# Patient Record
Sex: MALE | Race: WHITE | NOT HISPANIC OR LATINO | Employment: FULL TIME | URBAN - METROPOLITAN AREA
[De-identification: names, ages, dates, MRNs, and addresses within clinical notes are randomized per-mention and may not be internally consistent; named-entity substitution may affect disease eponyms.]

---

## 2017-04-08 ENCOUNTER — HOSPITAL ENCOUNTER (EMERGENCY)
Facility: HOSPITAL | Age: 27
Discharge: HOME | End: 2017-04-09
Payer: MEDICAID

## 2017-04-08 VITALS
OXYGEN SATURATION: 100 % | DIASTOLIC BLOOD PRESSURE: 86 MMHG | HEIGHT: 66 IN | BODY MASS INDEX: 24.91 KG/M2 | SYSTOLIC BLOOD PRESSURE: 138 MMHG | TEMPERATURE: 98 F | RESPIRATION RATE: 18 BRPM | HEART RATE: 119 BPM | WEIGHT: 155 LBS

## 2017-04-08 DIAGNOSIS — R40.20 LOC (LOSS OF CONSCIOUSNESS): ICD-10-CM

## 2017-04-08 PROCEDURE — 93005 ELECTROCARDIOGRAM TRACING: CPT

## 2017-04-08 PROCEDURE — 99900041 HC LEFT WITHOUT BEING SEEN- EMERGENCY

## 2019-02-12 ENCOUNTER — HOSPITAL ENCOUNTER (EMERGENCY)
Facility: HOSPITAL | Age: 29
Discharge: HOME | End: 2019-02-12
Attending: EMERGENCY MEDICINE

## 2019-02-12 VITALS
HEART RATE: 68 BPM | OXYGEN SATURATION: 96 % | SYSTOLIC BLOOD PRESSURE: 131 MMHG | DIASTOLIC BLOOD PRESSURE: 77 MMHG | WEIGHT: 210 LBS | TEMPERATURE: 98 F | BODY MASS INDEX: 32.96 KG/M2 | HEIGHT: 67 IN | RESPIRATION RATE: 18 BRPM

## 2019-02-12 DIAGNOSIS — R10.814 LEFT LOWER QUADRANT ABDOMINAL TENDERNESS WITHOUT REBOUND TENDERNESS: ICD-10-CM

## 2019-02-12 DIAGNOSIS — K59.00 CONSTIPATION, UNSPECIFIED CONSTIPATION TYPE: Primary | ICD-10-CM

## 2019-02-12 DIAGNOSIS — R10.32 LLQ ABDOMINAL PAIN: ICD-10-CM

## 2019-02-12 LAB
ALBUMIN SERPL BCP-MCNC: 4.3 G/DL
ALP SERPL-CCNC: 94 U/L
ALT SERPL W/O P-5'-P-CCNC: 169 U/L
ANION GAP SERPL CALC-SCNC: 9 MMOL/L
AST SERPL-CCNC: 72 U/L
BASOPHILS # BLD AUTO: 0.04 K/UL
BASOPHILS NFR BLD: 0.5 %
BILIRUB SERPL-MCNC: 0.7 MG/DL
BILIRUB UR QL STRIP: NEGATIVE
BUN SERPL-MCNC: 13 MG/DL
CALCIUM SERPL-MCNC: 9.9 MG/DL
CHLORIDE SERPL-SCNC: 105 MMOL/L
CLARITY UR: CLEAR
CO2 SERPL-SCNC: 26 MMOL/L
COLOR UR: YELLOW
CREAT SERPL-MCNC: 0.9 MG/DL
DIFFERENTIAL METHOD: NORMAL
EOSINOPHIL # BLD AUTO: 0 K/UL
EOSINOPHIL NFR BLD: 0.4 %
ERYTHROCYTE [DISTWIDTH] IN BLOOD BY AUTOMATED COUNT: 13.1 %
EST. GFR  (AFRICAN AMERICAN): >60 ML/MIN/1.73 M^2
EST. GFR  (NON AFRICAN AMERICAN): >60 ML/MIN/1.73 M^2
GLUCOSE SERPL-MCNC: 111 MG/DL
GLUCOSE UR QL STRIP: NEGATIVE
HCT VFR BLD AUTO: 45.6 %
HGB BLD-MCNC: 16.3 G/DL
HGB UR QL STRIP: NEGATIVE
KETONES UR QL STRIP: ABNORMAL
LEUKOCYTE ESTERASE UR QL STRIP: ABNORMAL
LIPASE SERPL-CCNC: 7 U/L
LYMPHOCYTES # BLD AUTO: 2.3 K/UL
LYMPHOCYTES NFR BLD: 30.1 %
MCH RBC QN AUTO: 30.4 PG
MCHC RBC AUTO-ENTMCNC: 35.7 G/DL
MCV RBC AUTO: 85 FL
MICROSCOPIC COMMENT: NORMAL
MONOCYTES # BLD AUTO: 0.5 K/UL
MONOCYTES NFR BLD: 6.1 %
NEUTROPHILS # BLD AUTO: 4.7 K/UL
NEUTROPHILS NFR BLD: 62.8 %
NITRITE UR QL STRIP: NEGATIVE
PH UR STRIP: 6 [PH] (ref 5–8)
PLATELET # BLD AUTO: 181 K/UL
PMV BLD AUTO: 10.9 FL
POTASSIUM SERPL-SCNC: 4.2 MMOL/L
PROT SERPL-MCNC: 8.1 G/DL
PROT UR QL STRIP: NEGATIVE
RBC # BLD AUTO: 5.36 M/UL
RBC #/AREA URNS HPF: 1 /HPF (ref 0–4)
SODIUM SERPL-SCNC: 140 MMOL/L
SP GR UR STRIP: 1.02 (ref 1–1.03)
URN SPEC COLLECT METH UR: ABNORMAL
UROBILINOGEN UR STRIP-ACNC: ABNORMAL EU/DL
WBC # BLD AUTO: 7.52 K/UL
WBC #/AREA URNS HPF: 1 /HPF (ref 0–5)

## 2019-02-12 PROCEDURE — 96360 HYDRATION IV INFUSION INIT: CPT

## 2019-02-12 PROCEDURE — 96372 THER/PROPH/DIAG INJ SC/IM: CPT | Mod: 59

## 2019-02-12 PROCEDURE — 85025 COMPLETE CBC W/AUTO DIFF WBC: CPT

## 2019-02-12 PROCEDURE — 63600175 PHARM REV CODE 636 W HCPCS: Performed by: NURSE PRACTITIONER

## 2019-02-12 PROCEDURE — 99285 EMERGENCY DEPT VISIT HI MDM: CPT | Mod: 25

## 2019-02-12 PROCEDURE — 25000003 PHARM REV CODE 250: Performed by: NURSE PRACTITIONER

## 2019-02-12 PROCEDURE — 81000 URINALYSIS NONAUTO W/SCOPE: CPT

## 2019-02-12 PROCEDURE — 25500020 PHARM REV CODE 255: Performed by: NURSE PRACTITIONER

## 2019-02-12 PROCEDURE — 80053 COMPREHEN METABOLIC PANEL: CPT

## 2019-02-12 PROCEDURE — 83690 ASSAY OF LIPASE: CPT

## 2019-02-12 RX ORDER — POLYETHYLENE GLYCOL 3350 17 G/17G
17 POWDER, FOR SOLUTION ORAL DAILY
Qty: 1 BOTTLE | Refills: 0 | Status: SHIPPED | OUTPATIENT
Start: 2019-02-12

## 2019-02-12 RX ORDER — DICYCLOMINE HYDROCHLORIDE 10 MG/ML
20 INJECTION INTRAMUSCULAR
Status: COMPLETED | OUTPATIENT
Start: 2019-02-12 | End: 2019-02-12

## 2019-02-12 RX ADMIN — IOHEXOL 75 ML: 350 INJECTION, SOLUTION INTRAVENOUS at 09:02

## 2019-02-12 RX ADMIN — DICYCLOMINE HYDROCHLORIDE 20 MG: 20 INJECTION, SOLUTION INTRAMUSCULAR at 08:02

## 2019-02-12 RX ADMIN — SODIUM CHLORIDE 1000 ML: 0.9 INJECTION, SOLUTION INTRAVENOUS at 08:02

## 2019-02-12 NOTE — ED TRIAGE NOTES
The pt states his left sided abdominal pain started on last Friday and it got a little better over the weekend. This morning the pain got worse and he had to leave work. Denies fever, n/v/d. Reports he has been having a cold for the last two weeks.

## 2019-02-12 NOTE — ED PROVIDER NOTES
Encounter Date: 2/12/2019    SCRIBE #1 NOTE: I, Daly Moore, am scribing for, and in the presence of,  Christian Luke NP. I have scribed the following portions of the note - Other sections scribed: HPI, ROS.       History     Chief Complaint   Patient presents with    Flank Pain     Left flank pain x 2 months worse past 2 days, denies  nausea.      CC: Abdominal Pain    HPI: This is a 29 y/o male with no pertinent PMHx who presents to the ED c/o gradual onset LLQ abdominal pain that began x2 months ago but worsened in the past week. Pt rates pain as moderate (5/10). Pt denies any recent trauma or fall. Denies past abdominal surgeries. Denies nausea, vomiting, diarrhea, constipation, dysuria, hematuria, or back pain. No further symptoms reported.      The history is provided by the patient. No  was used.     Review of patient's allergies indicates:  No Known Allergies  Past Medical History:   Diagnosis Date    Depression 9/22/2014     History reviewed. No pertinent surgical history.  Family History   Problem Relation Age of Onset    Cancer Mother     Alcohol abuse Mother      Social History     Tobacco Use    Smoking status: Current Every Day Smoker     Packs/day: 1.00    Smokeless tobacco: Never Used   Substance Use Topics    Alcohol use: Yes     Comment: occasionally    Drug use: No     Comment: heroin, cocaine; Stooped a 1 1/2 ago.     Review of Systems   Constitutional: Negative for chills and fever.   HENT: Negative for congestion, ear pain, rhinorrhea and sore throat.    Eyes: Negative for pain and visual disturbance.   Respiratory: Negative for cough and shortness of breath.    Cardiovascular: Negative for chest pain.   Gastrointestinal: Positive for abdominal pain (LLQ). Negative for diarrhea, nausea and vomiting.   Genitourinary: Negative for dysuria and hematuria.   Musculoskeletal: Negative for back pain and neck pain.   Skin: Negative for rash.   Neurological: Negative for  headaches.       Physical Exam     Initial Vitals [02/12/19 0732]   BP Pulse Resp Temp SpO2   (!) 140/89 102 16 98.7 °F (37.1 °C) 97 %      MAP       --         Physical Exam    Nursing note and vitals reviewed.  Constitutional: Vital signs are normal. He appears well-developed and well-nourished. He is not diaphoretic. He is sleeping and cooperative.  Non-toxic appearance. He does not have a sickly appearance. He does not appear ill. No distress.   Pleasant, afebrile, well appearing, in no distress   HENT:   Head: Normocephalic and atraumatic.   Right Ear: External ear normal.   Left Ear: External ear normal.   Nose: Nose normal.   Eyes: Conjunctivae and EOM are normal. Pupils are equal, round, and reactive to light. Right eye exhibits no discharge. Left eye exhibits no discharge. No scleral icterus.   Neck: Normal range of motion. Neck supple. No thyromegaly present. No tracheal deviation present. No JVD present.   Cardiovascular: Normal rate, regular rhythm, normal heart sounds and intact distal pulses. Exam reveals no gallop and no friction rub.    No murmur heard.  Pulmonary/Chest: Breath sounds normal. No stridor. No respiratory distress. He has no wheezes. He has no rhonchi. He has no rales. He exhibits no tenderness.   Abdominal: Soft. Bowel sounds are normal. He exhibits no distension and no mass. There is tenderness in the left lower quadrant. There is no rigidity, no rebound, no guarding, no CVA tenderness, no tenderness at McBurney's point and negative Carter's sign.   Left lower quadrant abdominal tenderness to palpation without rigidity or guarding. No rebound tenderness. No CVA tenderness. Remaining abdomen is soft and nontender.   Musculoskeletal: Normal range of motion. He exhibits no edema or tenderness.   Lymphadenopathy:     He has no cervical adenopathy.   Neurological: He is alert and oriented to person, place, and time. He has normal strength and normal reflexes. He displays normal reflexes.  No cranial nerve deficit or sensory deficit.   Skin: Skin is warm and dry. No rash and no abscess noted. No erythema. No pallor.   Psychiatric: He has a normal mood and affect. His behavior is normal. Judgment and thought content normal.         ED Course   Procedures  Labs Reviewed   COMPREHENSIVE METABOLIC PANEL - Abnormal; Notable for the following components:       Result Value    Glucose 111 (*)     AST 72 (*)      (*)     All other components within normal limits   URINALYSIS, REFLEX TO URINE CULTURE - Abnormal; Notable for the following components:    Ketones, UA Trace (*)     Urobilinogen, UA 4.0-6.0 (*)     Leukocytes, UA Trace (*)     All other components within normal limits    Narrative:     Preferred Collection Type->Urine, Clean Catch   CBC W/ AUTO DIFFERENTIAL   LIPASE   URINALYSIS MICROSCOPIC    Narrative:     Preferred Collection Type->Urine, Clean Catch          Imaging Results          CT Abdomen Pelvis With Contrast (Final result)  Result time 02/12/19 09:26:35    Final result by Sasha Aparicio MD (02/12/19 09:26:35)                 Impression:      No abnormality seen.      Electronically signed by: Sasha Aparicio MD  Date:    02/12/2019  Time:    09:26             Narrative:    EXAMINATION:  CT ABDOMEN PELVIS WITH CONTRAST    CLINICAL HISTORY:  LLQ pain, suspect diverticulitis;    TECHNIQUE:  Low dose axial images, sagittal and coronal reformations were obtained from the lung bases to the pubic symphysis following the IV administration of 100 mL of Omnipaque 350 .  Oral contrast was not given.    COMPARISON:  09/20/2014    FINDINGS:  The lung bases are clear.  The liver, gallbladder, stomach, pancreas bilateral adrenal glands and kidneys appear normal.  The aorta tapers normally, no para-aortic lymphadenopathy.  No atherosclerotic plaque.  Moderate stool retention, no inflammatory changes of the bowel seen, no bowel dilation.  No significant diverticulosis coli.  The appendix  is normal.  The bladder, prostate and seminal vesicles demonstrate nothing unusual.  No ascites.  The inguinal regions appear normal.  The osseous structures demonstrate no osseous lesions.                                 Medical Decision Making:   History:   Old Medical Records: I decided to obtain old medical records.  Differential Diagnosis:   Diverticulitis, colitis, small-bowel obstruction, bowel perforation, obstructive uropathy, constipation, others  Clinical Tests:   Lab Tests: Ordered and Reviewed  Radiological Study: Ordered and Reviewed  ED Management:  28-year-old male presenting for evaluation of left lower quadrant abdominal pain.  Denies nausea, vomiting, diarrhea, constipation, hematuria, dysuria, urinary frequency, flank pain, back pain, or any additional symptoms. Physical exam and HPI as above.  Exam remarkable for left lower quadrant abdominal tenderness without rigidity, guarding, or rebound tenderness. Remaining physical exam is unremarkable. CBC, CMP, lipase, and urinalysis are unremarkable. CT without concerning acute abnormalities.  There is no evidence of diverticulitis.  There is a moderate stool burden in the colon.  Symptoms possibly due to constipation related to patient's chronic methadone use.  Prescribed polyethylene glycol at discharge. Advised patient to follow up with his PCP for re-evaluation and further management.  ED return precautions given. All questions regarding diagnosis and plan were answered to the patient's fullest possible satisfaction. Patient expressed understanding of diagnosis, discharge instructions, and return precautions.    Attending physician was available for consultation during this case.            Scribe Attestation:   Scribe #1: I performed the above scribed service and the documentation accurately describes the services I performed. I attest to the accuracy of the note.    Attending Attestation:           Physician Attestation for Scribe:  Physician  Attestation Statement for Scribe #1: I, Christian Luke NP, reviewed documentation, as scribed by Daly Moore in my presence, and it is both accurate and complete.                    Clinical Impression:   The primary encounter diagnosis was Constipation, unspecified constipation type. Diagnoses of LLQ abdominal pain and Left lower quadrant abdominal tenderness without rebound tenderness were also pertinent to this visit.      Disposition:   Disposition: Discharged  Condition: Stable                        Christian Luke NP  02/12/19 0223

## 2019-02-12 NOTE — DISCHARGE INSTRUCTIONS
Take medication for constipation daily as prescribed to have several good bowel movements.  Drink a lot of water to help the medicine work.    Follow-up with your regular doctor or the one listed on your paperwork for re-evaluation and further treatment.    Return to the emergency department immediately for any new or worsening symptoms or as needed for any additional concerns.    Thank you for coming to our Emergency Department today. It is important to remember that some problems are difficult to diagnose and may not be found during your first visit. Be sure to follow up with your primary care doctor.  If you do not have one, you may contact the one listed on your discharge paperwork or you may also call the [facility] Appointment Desk at [phone] to schedule an appointment with one.     Return to the ER with any questions/concerns, new/concerning symptoms, worsening or failure to improve. Do not drive or make any important decisions for 24 hours if you have received any pain medications, sedatives or mood altering drugs during your ER visit.

## 2020-06-29 ENCOUNTER — OFFICE VISIT (OUTPATIENT)
Dept: OTOLARYNGOLOGY | Facility: CLINIC | Age: 30
End: 2020-06-29
Payer: COMMERCIAL

## 2020-06-29 DIAGNOSIS — H60.312 ACUTE DIFFUSE OTITIS EXTERNA OF LEFT EAR: Primary | ICD-10-CM

## 2020-06-29 DIAGNOSIS — H61.22 IMPACTED CERUMEN OF LEFT EAR: ICD-10-CM

## 2020-06-29 PROCEDURE — 69210 EAR CERUMEN REMOVAL: ICD-10-PCS | Mod: S$GLB,,, | Performed by: NURSE PRACTITIONER

## 2020-06-29 PROCEDURE — 99999 PR PBB SHADOW E&M-EST. PATIENT-LVL II: CPT | Mod: PBBFAC,,, | Performed by: NURSE PRACTITIONER

## 2020-06-29 PROCEDURE — 99203 PR OFFICE/OUTPT VISIT, NEW, LEVL III, 30-44 MIN: ICD-10-PCS | Mod: 25,S$GLB,, | Performed by: NURSE PRACTITIONER

## 2020-06-29 PROCEDURE — 69210 REMOVE IMPACTED EAR WAX UNI: CPT | Mod: S$GLB,,, | Performed by: NURSE PRACTITIONER

## 2020-06-29 PROCEDURE — 99999 PR PBB SHADOW E&M-EST. PATIENT-LVL II: ICD-10-PCS | Mod: PBBFAC,,, | Performed by: NURSE PRACTITIONER

## 2020-06-29 PROCEDURE — 99203 OFFICE O/P NEW LOW 30 MIN: CPT | Mod: 25,S$GLB,, | Performed by: NURSE PRACTITIONER

## 2020-06-29 RX ORDER — NEOMYCIN SULFATE, POLYMYXIN B SULFATE AND HYDROCORTISONE 10; 3.5; 1 MG/ML; MG/ML; [USP'U]/ML
4 SUSPENSION/ DROPS AURICULAR (OTIC) 3 TIMES DAILY
Qty: 10 ML | Refills: 0 | Status: SHIPPED | OUTPATIENT
Start: 2020-06-29 | End: 2020-07-06

## 2020-06-29 NOTE — PROCEDURES
Ear Cerumen Removal    Date/Time: 6/29/2020 11:00 AM  Performed by: Nila Sarmiento NP  Authorized by: Nila Sarmiento NP     Location details:  Left ear  Procedure type: curette    Cerumen  Removal Results:  Cerumen completely removed  Patient tolerance:  Patient tolerated the procedure well with no immediate complications     Procedure Note:    The patient was brought to the minor procedure room and placed under the operating microscope of the left ear canal which was cleaned of ceruminous debris. Using a combination of suction, curettes and cup forceps the patient's cerumen impaction was removed. The tympanic membrane was evaluated and was unremarkable. The patient tolerated the procedure well. There were no complications.

## 2020-06-29 NOTE — PROGRESS NOTES
Subjective:      Sergio Cochran is a 29 y.o. male who was self-referred for ear pain.    Mr. Cochran reports left ear pain that started yesterday after swimming over the weekend. She reports associated muffled hearing in left ear and tinnitus. He denies fever, chills, nasal or sinus symptoms. He has tried tylenol with some benefit.  There is not a family history of hearing loss at a young age.  There is not a prior history of ear surgery.  There is not a prior history of ear infections .  He admits to a history of significant noise exposure.  He does not wear hearing aids currently.      Past Medical History  He has a past medical history of Depression.    Past Surgical History  He has no past surgical history on file.    Family History  His family history includes Alcohol abuse in his mother; Cancer in his mother.    Social History  He reports that he has been smoking. He has been smoking about 1.00 pack per day. He has never used smokeless tobacco. He reports current alcohol use. He reports that he does not use drugs.    Allergies  He has No Known Allergies.    Medications  He has a current medication list which includes the following prescription(s): methadone hcl, polyethylene glycol, and neomycin-polymyxin-hydrocortisone.    Review of Systems   Constitutional: Negative for chills, fever and unexpected weight change.   HENT: Positive for ear discharge, ear pain and tinnitus. Negative for hearing loss, sore throat and trouble swallowing.    Eyes: Negative for pain and visual disturbance.   Respiratory: Negative for apnea and shortness of breath.    Cardiovascular: Negative for chest pain and palpitations.   Gastrointestinal: Negative for abdominal pain and nausea.   Endocrine: Negative for cold intolerance and heat intolerance.   Musculoskeletal: Negative for joint swelling and neck stiffness.   Skin: Negative for color change and rash.   Neurological: Negative for dizziness, facial asymmetry and headaches.    Hematological: Negative for adenopathy. Does not bruise/bleed easily.   Psychiatric/Behavioral: Negative for agitation and sleep disturbance. The patient is not nervous/anxious.           Objective:     There were no vitals taken for this visit.     Constitutional:   Vital signs are normal. He appears well-developed and well-nourished.     Head:  Normocephalic and atraumatic.     Ears:    Right Ear: No lacerations. No drainage, swelling or tenderness. No foreign bodies. No mastoid tenderness. Tympanic membrane is not injected, not scarred, not perforated, not erythematous, not retracted and not bulging. Tympanic membrane mobility is normal. No middle ear effusion. No hemotympanum.   Left Ear: No lacerations. There is drainage, swelling and tenderness. No foreign bodies. No mastoid tenderness. Tympanic membrane is injected. Tympanic membrane is not scarred, not perforated, not erythematous, not retracted and not bulging. Tympanic membrane mobility is normal.  No middle ear effusion. No hemotympanum.   Ears:      Nose:  Nose normal including turbinates, nasal mucosa, sinuses and nasal septum.     Mouth/Throat  Lips, teeth, and gums normal and oropharynx normal.     Neck:  Neck normal without thyromegaly masses, asymmetry, normal tracheal structure, crepitus, and tenderness and no adenopathy.     Psychiatric:   He has a normal mood and affect.       Procedure    Cerumen removal performed.  See procedure note.      Data Reviewed    WBC (K/uL)   Date Value   02/12/2019 7.52     Platelets (K/uL)   Date Value   02/12/2019 181      Creatinine (mg/dL)   Date Value   02/12/2019 0.9     No results found for: TSH  Glucose (mg/dL)   Date Value   02/12/2019 111 (H)     No results found for: HGBA1C       Assessment:     1. Acute diffuse otitis externa of left ear    2. Impacted cerumen of left ear         Plan:     I had a long discussion with the patient regarding his condition and the further workup and management options.     Sergio MORALES was seen today for otitis media, tinnitus and ear fullness.    Diagnoses and all orders for this visit:    Acute diffuse otitis externa of left ear  -     neomycin-polymyxin-hydrocortisone (CORTISPORIN) 3.5-10,000-1 mg/mL-unit/mL-% otic suspension; Place 4 drops into the left ear 3 (three) times daily. for 7 days    Impacted cerumen of left ear  -     Ear Cerumen Removal        Follow up if symptoms worsen or fail to improve.

## 2021-04-16 ENCOUNTER — PATIENT MESSAGE (OUTPATIENT)
Dept: RESEARCH | Facility: HOSPITAL | Age: 31
End: 2021-04-16

## 2024-03-26 ENCOUNTER — OFFICE VISIT (OUTPATIENT)
Dept: FAMILY MEDICINE | Facility: CLINIC | Age: 34
End: 2024-03-26
Payer: COMMERCIAL

## 2024-03-26 ENCOUNTER — LAB VISIT (OUTPATIENT)
Dept: LAB | Facility: HOSPITAL | Age: 34
End: 2024-03-26
Attending: FAMILY MEDICINE
Payer: COMMERCIAL

## 2024-03-26 ENCOUNTER — OFFICE VISIT (OUTPATIENT)
Dept: GASTROENTEROLOGY | Facility: CLINIC | Age: 34
End: 2024-03-26
Payer: COMMERCIAL

## 2024-03-26 ENCOUNTER — TELEPHONE (OUTPATIENT)
Dept: FAMILY MEDICINE | Facility: CLINIC | Age: 34
End: 2024-03-26

## 2024-03-26 VITALS
BODY MASS INDEX: 26.13 KG/M2 | HEART RATE: 67 BPM | OXYGEN SATURATION: 96 % | WEIGHT: 172.38 LBS | DIASTOLIC BLOOD PRESSURE: 62 MMHG | SYSTOLIC BLOOD PRESSURE: 100 MMHG | HEIGHT: 68 IN

## 2024-03-26 VITALS
SYSTOLIC BLOOD PRESSURE: 111 MMHG | DIASTOLIC BLOOD PRESSURE: 60 MMHG | HEIGHT: 68 IN | HEART RATE: 58 BPM | WEIGHT: 178 LBS | BODY MASS INDEX: 26.98 KG/M2

## 2024-03-26 DIAGNOSIS — R19.4 CHANGE IN BOWEL HABITS: ICD-10-CM

## 2024-03-26 DIAGNOSIS — R10.9 ABDOMINAL PAIN, UNSPECIFIED ABDOMINAL LOCATION: ICD-10-CM

## 2024-03-26 DIAGNOSIS — R00.2 PALPITATIONS: Primary | ICD-10-CM

## 2024-03-26 DIAGNOSIS — R14.3 FLATUS: ICD-10-CM

## 2024-03-26 DIAGNOSIS — R00.2 PALPITATIONS: ICD-10-CM

## 2024-03-26 DIAGNOSIS — R14.2 BELCHING: ICD-10-CM

## 2024-03-26 DIAGNOSIS — R42 DIZZINESS: ICD-10-CM

## 2024-03-26 DIAGNOSIS — Z80.0 FAMILY HISTORY OF COLON CANCER: ICD-10-CM

## 2024-03-26 DIAGNOSIS — K59.00 CONSTIPATION, UNSPECIFIED CONSTIPATION TYPE: ICD-10-CM

## 2024-03-26 DIAGNOSIS — R10.84 GENERALIZED ABDOMINAL PAIN: ICD-10-CM

## 2024-03-26 DIAGNOSIS — Z87.19 HISTORY OF CIRRHOSIS: ICD-10-CM

## 2024-03-26 DIAGNOSIS — Z86.19 HISTORY OF HEPATITIS C: ICD-10-CM

## 2024-03-26 LAB
25(OH)D3+25(OH)D2 SERPL-MCNC: 49 NG/ML (ref 30–96)
AMYLASE SERPL-CCNC: 97 U/L (ref 20–110)
LIPASE SERPL-CCNC: 14 U/L (ref 4–60)
TSH SERPL DL<=0.005 MIU/L-ACNC: 1.09 UIU/ML (ref 0.4–4)

## 2024-03-26 PROCEDURE — 3074F SYST BP LT 130 MM HG: CPT | Mod: CPTII,S$GLB,, | Performed by: FAMILY MEDICINE

## 2024-03-26 PROCEDURE — 3078F DIAST BP <80 MM HG: CPT | Mod: CPTII,S$GLB,,

## 2024-03-26 PROCEDURE — 3008F BODY MASS INDEX DOCD: CPT | Mod: CPTII,S$GLB,,

## 2024-03-26 PROCEDURE — 1159F MED LIST DOCD IN RCRD: CPT | Mod: CPTII,S$GLB,, | Performed by: FAMILY MEDICINE

## 2024-03-26 PROCEDURE — 82150 ASSAY OF AMYLASE: CPT | Performed by: FAMILY MEDICINE

## 2024-03-26 PROCEDURE — 99204 OFFICE O/P NEW MOD 45 MIN: CPT | Mod: S$GLB,,, | Performed by: FAMILY MEDICINE

## 2024-03-26 PROCEDURE — 99999 PR PBB SHADOW E&M-EST. PATIENT-LVL V: CPT | Mod: PBBFAC,,,

## 2024-03-26 PROCEDURE — 3008F BODY MASS INDEX DOCD: CPT | Mod: CPTII,S$GLB,, | Performed by: FAMILY MEDICINE

## 2024-03-26 PROCEDURE — 36415 COLL VENOUS BLD VENIPUNCTURE: CPT | Mod: PO | Performed by: FAMILY MEDICINE

## 2024-03-26 PROCEDURE — 1159F MED LIST DOCD IN RCRD: CPT | Mod: CPTII,S$GLB,,

## 2024-03-26 PROCEDURE — 99999 PR PBB SHADOW E&M-EST. PATIENT-LVL III: CPT | Mod: PBBFAC,,, | Performed by: FAMILY MEDICINE

## 2024-03-26 PROCEDURE — 86677 HELICOBACTER PYLORI ANTIBODY: CPT | Performed by: FAMILY MEDICINE

## 2024-03-26 PROCEDURE — 3074F SYST BP LT 130 MM HG: CPT | Mod: CPTII,S$GLB,,

## 2024-03-26 PROCEDURE — 84443 ASSAY THYROID STIM HORMONE: CPT | Performed by: FAMILY MEDICINE

## 2024-03-26 PROCEDURE — 83690 ASSAY OF LIPASE: CPT | Performed by: FAMILY MEDICINE

## 2024-03-26 PROCEDURE — 3078F DIAST BP <80 MM HG: CPT | Mod: CPTII,S$GLB,, | Performed by: FAMILY MEDICINE

## 2024-03-26 PROCEDURE — 82306 VITAMIN D 25 HYDROXY: CPT | Performed by: FAMILY MEDICINE

## 2024-03-26 PROCEDURE — 99204 OFFICE O/P NEW MOD 45 MIN: CPT | Mod: S$GLB,,,

## 2024-03-26 PROCEDURE — 1160F RVW MEDS BY RX/DR IN RCRD: CPT | Mod: CPTII,S$GLB,,

## 2024-03-26 RX ORDER — METOPROLOL TARTRATE 25 MG/1
1 TABLET, FILM COATED ORAL 2 TIMES DAILY
COMMUNITY
Start: 2024-03-18

## 2024-03-26 RX ORDER — PHENOL/SODIUM PHENOLATE
1 AEROSOL, SPRAY (ML) MUCOUS MEMBRANE DAILY
Qty: 30 EACH | Refills: 0 | Status: SHIPPED | OUTPATIENT
Start: 2024-03-26 | End: 2025-03-26

## 2024-03-26 NOTE — PROGRESS NOTES
Subjective:       Patient ID: Sergio Cochran is a 33 y.o. male Body mass index is 27.06 kg/m².    Chief Complaint: Abdominal Pain and Constipation    This patient is new to me.  Former patient of Dr. Benny Matthew.     Abdominal Pain  This is a new problem. The current episode started more than 1 month ago (started 4-5 weesk ago). The onset quality is sudden. The problem occurs daily (couple times a day). Duration: lasts 15 seconds. The problem has been gradually worsening. The pain is located in the periumbilical region. The pain is at a severity of 0/10 (deneis pain currently). The patient is experiencing no pain. The quality of the pain is cramping (hears noises and feels like a knot in abdomen). The abdominal pain does not radiate. Associated symptoms include belching (Infrequent), constipation (recent constipation; currently having 1 BM every 4 days rated stool 4 on [place] scale; denies straining and feels complete after BMs; taking MiraLax daily and probiotic (started after he was treated w/ antibitoics; past treatments:  Vegetable laxative), flatus, headaches (since starting bp medicaiton) and nausea (Once or so a week typically after eating). Pertinent negatives include no diarrhea, fever, hematochezia, hematuria, melena, vomiting or weight loss. Associated symptoms comments: CHIEF COMPLAINT: Patient reports concern with episodes of heart racing that typically occurs 30 minutes after eating; he was referred to Cardiology and was given a beta-blocker for sinus tachycardia. The pain is aggravated by palpation (sponta). The pain is relieved by Nothing. Prior diagnostic workup includes GI consult. There is no history of abdominal surgery, colon cancer, Crohn's disease, gallstones, GERD, irritable bowel syndrome, pancreatitis, PUD or ulcerative colitis. Patient's medical history does not include kidney stones.     Review of Systems   Constitutional:  Negative for activity change, appetite change, chills,  diaphoresis, fatigue, fever, unexpected weight change and weight loss.   HENT:  Negative for sore throat and trouble swallowing.    Respiratory:  Negative for cough, choking and shortness of breath.    Cardiovascular:  Negative for chest pain.   Gastrointestinal:  Positive for abdominal pain, constipation (recent constipation; currently having 1 BM every 4 days rated stool 4 on [place] scale; denies straining and feels complete after BMs; taking MiraLax daily and probiotic (started after he was treated w/ antibitoics; past treatments:  Vegetable laxative), flatus and nausea (Once or so a week typically after eating). Negative for abdominal distention, anal bleeding, blood in stool, diarrhea, hematochezia, melena, rectal pain and vomiting.   Genitourinary:  Negative for hematuria.   Neurological:  Positive for headaches (since starting bp medicaiton).       No LMP for male patient.  Past Medical History:   Diagnosis Date    Depression 09/22/2014    Liver disease     Hx of hep C - treated     History reviewed. No pertinent surgical history.  Family History   Problem Relation Age of Onset    Colon polyps Mother     Cancer Mother     Alcohol abuse Mother     Colon cancer Maternal Uncle 60    Esophageal cancer Neg Hx     Stomach cancer Neg Hx      Social History     Tobacco Use    Smoking status: Former     Current packs/day: 1.00     Types: Cigarettes    Smokeless tobacco: Never    Tobacco comments:     Stopped vaping 3 weeks ago   Substance Use Topics    Alcohol use: Not Currently     Comment: has not had alcohol in 1 year    Drug use: No     Types: Cocaine     Comment: heroin, cocaine; Stooped a 1 1/2 ago.     Wt Readings from Last 10 Encounters:   03/26/24 80.7 kg (178 lb)   03/26/24 78.2 kg (172 lb 6.4 oz)   02/12/19 95.3 kg (210 lb)   09/20/14 63.5 kg (140 lb)   09/20/14 63.5 kg (140 lb)   11/02/13 68 kg (150 lb)     Lab Results   Component Value Date    WBC 7.52 02/12/2019    HGB 14.4 09/03/2023    HCT 41.4  09/03/2023    MCV 85 02/12/2019     02/12/2019     CMP  Sodium   Date Value Ref Range Status   02/12/2019 140 136 - 145 mmol/L Final     Potassium   Date Value Ref Range Status   02/12/2019 4.2 3.5 - 5.1 mmol/L Final     Chloride   Date Value Ref Range Status   02/12/2019 105 95 - 110 mmol/L Final     CO2   Date Value Ref Range Status   02/12/2019 26 23 - 29 mmol/L Final     Glucose   Date Value Ref Range Status   02/12/2019 111 (H) 70 - 110 mg/dL Final     BUN   Date Value Ref Range Status   02/12/2019 13 6 - 20 mg/dL Final     Creatinine   Date Value Ref Range Status   02/12/2019 0.9 0.5 - 1.4 mg/dL Final     Calcium   Date Value Ref Range Status   02/12/2019 9.9 8.7 - 10.5 mg/dL Final     Total Protein   Date Value Ref Range Status   02/12/2019 8.1 6.0 - 8.4 g/dL Final     Albumin   Date Value Ref Range Status   02/12/2019 4.3 3.5 - 5.2 g/dL Final     Total Bilirubin   Date Value Ref Range Status   02/12/2019 0.7 0.1 - 1.0 mg/dL Final     Comment:     For infants and newborns, interpretation of results should be based  on gestational age, weight and in agreement with clinical  observations.  Premature Infant recommended reference ranges:  Up to 24 hours.............<8.0 mg/dL  Up to 48 hours............<12.0 mg/dL  3-5 days..................<15.0 mg/dL  6-29 days.................<15.0 mg/dL       Alkaline Phosphatase   Date Value Ref Range Status   02/12/2019 94 55 - 135 U/L Final     AST   Date Value Ref Range Status   02/12/2019 72 (H) 10 - 40 U/L Final     ALT   Date Value Ref Range Status   02/12/2019 169 (H) 10 - 44 U/L Final     Anion Gap   Date Value Ref Range Status   02/12/2019 9 8 - 16 mmol/L Final     eGFR if    Date Value Ref Range Status   02/12/2019 >60 >60 mL/min/1.73 m^2 Final     eGFR if non    Date Value Ref Range Status   02/12/2019 >60 >60 mL/min/1.73 m^2 Final     Comment:     Calculation used to obtain the estimated glomerular filtration  rate (eGFR) is  the CKD-EPI equation.        Lab Results   Component Value Date    AMYLASE 97 03/26/2024     Lab Results   Component Value Date    LIPASE 14 03/26/2024     Reviewed prior medical records including radiology report of CT abdomen and pelvis 08/23/2023, liver ultrasound 10/17/2020, abdominal ultrasound 09/12/2020 & endoscopy history (see surgical history).    Objective:      Physical Exam  Vitals and nursing note reviewed.   Constitutional:       General: He is not in acute distress.     Appearance: Normal appearance. He is not ill-appearing.   HENT:      Mouth/Throat:      Lips: Pink. No lesions.   Cardiovascular:      Rate and Rhythm: Normal rate and regular rhythm.      Pulses: Normal pulses.      Heart sounds: Normal heart sounds.   Pulmonary:      Effort: Pulmonary effort is normal. No respiratory distress.      Breath sounds: Normal breath sounds.   Abdominal:      General: Bowel sounds are normal. There is no distension or abdominal bruit. There are no signs of injury.      Palpations: Abdomen is soft. There is no shifting dullness, fluid wave, hepatomegaly, splenomegaly or mass.      Tenderness: There is generalized abdominal tenderness. There is no guarding or rebound. Negative signs include Carter's sign, Rovsing's sign and McBurney's sign.   Skin:     General: Skin is warm and dry.      Coloration: Skin is not jaundiced or pale.   Neurological:      Mental Status: He is alert and oriented to person, place, and time.      Gait: Gait abnormal.      Comments: Patient using crutches for assistance with ambulation   Psychiatric:         Attention and Perception: Attention normal.         Mood and Affect: Mood normal.         Speech: Speech normal.         Behavior: Behavior normal.         Assessment:       1. Palpitations    2. Constipation, unspecified constipation type    3. Change in bowel habits    4. Generalized abdominal pain    5. Belching    6. Flatus    7. History of cirrhosis    8. History of hepatitis  C    9. Family history of colon cancer        Plan:       Palpitations  - follow-up with Cardiology for continued evaluation and management    Constipation, unspecified constipation type  - schedule Colonoscopy, discussed procedure with the patient, including risks and benefits, patient verbalized understanding  -Recommend daily exercise as tolerated, adequate water intake (six 8-oz glasses of water daily), and high fiber diet. OTC fiber supplements are recommended if diet does not reach daily fiber goal (20-30 grams daily), such as Metamucil, Citrucel, or FiberCon (take as directed, separate from other oral medications by >2 hours).  -CONTINUE OTC MiraLax once daily (17g PO) as directed  -If no improvement with above recommendations, try intermittently dosed Dulcolax OTC as directed (every 3-4 days) PRN to facilitate bowel movements  -If no relief with this, consider adding a emollient laxative (castor oil or mineral oil) +/- enema  -If still no improvement with these measures, call/follow-up  -     Case Request Endoscopy: COLONOSCOPY  - consider Movantik  - discussed with patient that a side effect of narcotic medications is constipation    Change in bowel habits  - schedule Colonoscopy, discussed procedure with the patient, including risks and benefits, patient verbalized understanding  -     Case Request Endoscopy: COLONOSCOPY    Generalized abdominal pain  - schedule EGD, discussed procedure with patient, including risks and benefits, patient verbalized understanding  - schedule Colonoscopy, discussed procedure with the patient, including risks and benefits, patient verbalized understanding  -     Case Request Endoscopy: EGD (ESOPHAGOGASTRODUODENOSCOPY)  -     Case Request Endoscopy: COLONOSCOPY  -     US Abdomen Complete; Future; Expected date: 03/26/2024  - start Omeprazole 20 mg once daily    Belching & Flatus  - schedule EGD, discussed procedure with patient, including risks and benefits, patient verbalized  understanding  - schedule Colonoscopy, discussed procedure with the patient, including risks and benefits, patient verbalized understanding  testing for H. Pylori typically performed during EGD  - recommend OTC simethicone as directed, such as Phazyme or Gas-x  - recommend low gas diet: Reduce or eliminate these foods from your diet: Broccoli, Cauliflower, [place] sprouts, Cabbage, Cooked dried beans, Carbonated beverages (sparkling water, soda, beer, champagne)  Other Causes Of Excess Gas Include:   1) EATING TOO FAST or TALKING WHILE YOU CHEW may cause you to swallow air. This increases the amount of gas in the stomach and may worsen your symptoms.  --> Chew each mouthful completely before swallowing. Take your time.  2) OVEREATING may increase the feeling of being bloated and cause more gas.  --> When you are full, stop eating.  3) CONSTIPATION can increase the amount of normal intestinal gas.  --> Avoid constipation by increasing the amount of fiber in your diet by including whole cereal grains, fresh vegetables (except those in the above list) and fresh fruits. High-fiber foods absorb water and carry it out of the body. When increasing the amount of fiber in your diet, you also need to increase the amount of water that you drink. You should drink at least eight 8-ounce glasses of water (two quarts) per day.  -     Case Request Endoscopy: EGD (ESOPHAGOGASTRODUODENOSCOPY)  -     Case Request Endoscopy: COLONOSCOPY  - start Omeprazole 20 mg once daily    History of cirrhosis & History of hepatitis C  -     Ambulatory referral/consult to Hepatology; Future; Expected date: 04/02/2024  -     Case Request Endoscopy: EGD (ESOPHAGOGASTRODUODENOSCOPY)  -     US Abdomen Complete; Future; Expected date: 03/26/2024    Family history of colon cancer  - schedule Colonoscopy, discussed procedure with the patient, including risks and benefits, patient verbalized understanding    Follow up in about 4 weeks (around 4/23/2024),  or if symptoms worsen or fail to improve.      If no improvement in symptoms or symptoms worsen, call/follow-up at clinic or go to ER.        Total time spent on the encounter includes face to face time and non-face to face time preparing to see the patient (eg, review of tests), Obtaining and/or reviewing separately obtained history, Documenting clinical information in the electronic or other health record, Independently interpreting results (not separately reported) and communicating results to the patient/family/caregiver, or Care coordination (not separately reported).     A dictation software program was used for this note. Please expect some simple typographical  errors in this note.

## 2024-03-26 NOTE — PATIENT INSTRUCTIONS
Can try one time dose of OTC magnesium citrate (295ml- take as directed) to help clear the stool out of the colon. Otherwise, continue previous recommendations as discussed.

## 2024-03-26 NOTE — PROGRESS NOTES
Assessment & Plan  Problem List Items Addressed This Visit    None     Assessment & Plan  1. Abdominal discomfort.  He is constipated. I do not know if the methadone is causing the problem or why he is having more abdominal discomfort. He can continue to take the probiotic. He was advised to eat fermented types of foods. I will obtain blood work to check for H. pylori antibody for IgG. If it is positive, then he has H. pylori, which needs to be cleared with an antibiotic. I will obtain amylase and lipase. I will obtain an EGD. I will send in a prescription for omeprazole for 14 days.    2. Fatigue.  I will obtain vitamin D. I will provide him with a work note.    Follow-up  The patient will follow up in 1 to 2 weeks.    Results  Laboratory Studies  Labs were reviewed with the patient.    Imaging  EKG was reviewed with the patient.      Health Maintenance reviewed.    Charts from [facility] [place] reviewed.  Noted history of osteomyelitis.   ______________________________________________________________________    Chief Complaint  No chief complaint on file.      HPI  Sergio Cochran is a 33 y.o. male with multiple medical diagnoses as listed in the medical history and problem list that presents for palpitations.  Pt is new to me.  History of Present Illness  The patient presents for evaluation of multiple medical concerns.    He has been to 2 other doctors. He has been having problems with his stomach. Whenever he eats something or takes medicine, his heart starts racing. He went to the heart doctor and they gave him some high blood pressure medicine to keep his heart rate down. Yesterday, he managed to schedule an appointment with a gastroenterologist. He went to the emergency room twice and they keep telling him that it is anxiety. His stomach knots up. Every time he uses the bathroom or eats, his heart races. Yesterday, he had red beans and rice with ham and sausage for dinner. For lunch yesterday, he had a tomato and a  lunch bowl. For breakfast, he had 2 boiled eggs and yogurt. These symptoms started about 1 month ago. He had regurgitation once or twice. He gets dizzy, cloudy, and can not remember things. He has no energy. He was on antibiotics for 2 months and thinks that messed up his stomach too. He tried probiotics when he had an infection in his knee in 10/2023 because his whole body was messed up for 2 months. He took 1 probiotic gummies yesterday. He describes his abdominal discomfort as growling loud, which is not painful. His stomach feels more sensitive. He has not had a bowel movement in 4 days. Before this past week, he was having a bowel movement almost every day. Sometimes, he will skip a day because the methadone he is on messes with him using the bathroom. He gets methadone from [facility] on [address]. Methadone has caused constipation in the past. He has not exercised in the last 8 months. He has been on crutches for 8 months now. He has not walked. All he does is go to work, sit at his desk, and then go home and sit on the couch. He did some physical therapy, but that is not too much exercise. He has had 5 surgeries and is supposed to go back and get another surgery in the next 2 weeks. He denies any blood in his stool. His stool goes from hard to soft. When he got on the methadone, his stool was 90 percent hard. He has not noticed any unusual rashes. His arms are red in certain spots, which is new. He has not used any antacids for his acid reflux. He has not been checked for H. pylori.    Supplemental Information  He went to the emergency room last Friday because he got dizzy and could not catch his breath. They wanted him to get a doctor's note releasing him to go back to work. He plans to go back to work on Thursday or Friday.     [address]  [employer]  Currently       PAST MEDICAL HISTORY:  Past Medical History:   Diagnosis Date    Depression 9/22/2014       PAST SURGICAL HISTORY:  History reviewed. No  pertinent surgical history.    SOCIAL HISTORY:  Social History     Socioeconomic History    Marital status: Single   Tobacco Use    Smoking status: Every Day     Current packs/day: 1.00     Types: Cigarettes    Smokeless tobacco: Never   Substance and Sexual Activity    Alcohol use: Yes     Comment: occasionally    Drug use: No     Types: Cocaine     Comment: heroin, cocaine; Stooped a 1 1/2 ago.    Sexual activity: Yes     Partners: Female       FAMILY HISTORY:  Family History   Problem Relation Age of Onset    Cancer Mother     Alcohol abuse Mother        ALLERGIES AND MEDICATIONS: updated and reviewed.  Review of patient's allergies indicates:  No Known Allergies  Current Outpatient Medications   Medication Sig Dispense Refill    methadone HCl (METHADONE ORAL) Take by mouth.      metoprolol tartrate (LOPRESSOR) 25 MG tablet Take 1 tablet by mouth 2 (two) times daily.      polyethylene glycol (GLYCOLAX) 17 gram/dose powder Take 17 g by mouth once daily. Until you have several good bowel movements. (Patient not taking: Reported on 3/26/2024) 1 Bottle 0     No current facility-administered medications for this visit.         ROS  Review of Systems   Constitutional:  Positive for activity change. Negative for appetite change, fatigue, fever and unexpected weight change.   HENT:  Negative for congestion and facial swelling.    Eyes:  Negative for visual disturbance.   Respiratory:  Negative for chest tightness, shortness of breath, wheezing and stridor.    Cardiovascular:  Negative for chest pain, palpitations and leg swelling.   Gastrointestinal:  Positive for abdominal distention, abdominal pain, constipation and diarrhea. Negative for blood in stool, nausea and vomiting.   Endocrine: Negative for cold intolerance, heat intolerance, polydipsia and polyuria.   Skin: Negative.    Allergic/Immunologic: Negative.    Neurological:  Negative for dizziness, light-headedness, numbness and headaches.  "  Psychiatric/Behavioral:  Negative for agitation and decreased concentration.            Physical Exam  Vitals:    03/26/24 0731   BP: 100/62   Pulse: 67   SpO2: 96%   Weight: 78.2 kg (172 lb 6.4 oz)   Height: 5' 8" (1.727 m)    Body mass index is 26.21 kg/m².  Weight: 78.2 kg (172 lb 6.4 oz)   Height: 5' 8" (172.7 cm)   Physical Exam  Vitals reviewed.   Constitutional:       Appearance: Normal appearance. He is well-developed.   HENT:      Head: Normocephalic and atraumatic.      Right Ear: External ear normal.      Left Ear: External ear normal.      Nose: Nose normal.   Eyes:      Extraocular Movements: Extraocular movements intact.      Conjunctiva/sclera: Conjunctivae normal.      Pupils: Pupils are equal, round, and reactive to light.   Cardiovascular:      Rate and Rhythm: Normal rate and regular rhythm.      Heart sounds: Normal heart sounds.   Pulmonary:      Effort: Pulmonary effort is normal.      Breath sounds: Normal breath sounds.   Abdominal:      General: There is distension.      Tenderness: There is abdominal tenderness. There is rebound. There is no guarding.      Comments: Hyperactive bowel sounds    Musculoskeletal:      Cervical back: Normal range of motion.   Skin:     General: Skin is warm and dry.   Neurological:      Mental Status: He is alert and oriented to person, place, and time.   Psychiatric:         Behavior: Behavior normal.        Physical Exam  Abdomen is hyperactive.         Health Maintenance         Date Due Completion Date    HIV Screening Never done ---    TETANUS VACCINE 03/26/2019 3/26/2009    Hemoglobin A1c (Diabetic Prevention Screening) Never done ---    Influenza Vaccine (1) 09/01/2023 9/17/2020    COVID-19 Vaccine (2 - 2023-24 season) 09/01/2023 8/23/2021    Lipid Panel 08/25/2025 8/25/2020                Patient note was created using [employer].  Any errors in syntax or even information may not have been identified and edited on initial review prior to signing this " note.

## 2024-03-26 NOTE — TELEPHONE ENCOUNTER
----- Message from Gege Gallo sent at 3/26/2024  8:54 AM CDT -----  Regarding: [id number]  Type: Patient Call Back    Who called:self     What is the request in detail: pt states he just left there from his visit and forgot to get a return to work letter from the doctor or the nurse . Pt states he will like a call back from the nurse.     Can the clinic reply by [facility]?no     Would the patient rather a call back or a response via [facility]? Call back     Best call back number:[phone]       Additional Information:

## 2024-03-26 NOTE — LETTER
March 26, 2024      [facility]  [address] [facility]  [place] [address]  Phone: [phone]  Fax: [phone]       Patient: Sergio Cochran   YOB: 1990  Date of Visit: 03/26/2024    To Whom It May Concern:    Madeleine Cochran  was at [facility] on 03/26/2024. The patient may return to work/school on 3/29/2024 with no restrictions. If you have any questions or concerns, or if I can be of further assistance, please do not hesitate to contact me.    Sincerely,    Annabelle Marsh MD

## 2024-03-27 ENCOUNTER — TELEPHONE (OUTPATIENT)
Dept: GASTROENTEROLOGY | Facility: CLINIC | Age: 34
End: 2024-03-27
Payer: COMMERCIAL

## 2024-03-27 LAB — H PYLORI IGG SERPL QL IA: NEGATIVE

## 2024-03-27 NOTE — TELEPHONE ENCOUNTER
----- Message from Fe Jensen sent at 3/27/2024 12:13 PM CDT -----  Type:  Patient Returning Call    Who Called:  pt   Who Left Message for Patient:  nurse   Does the patient know what this is regarding?:  no   Best Call Back Number:  [phone] (home)     Additional Information:  please advise

## 2024-05-13 ENCOUNTER — TELEPHONE (OUTPATIENT)
Dept: GASTROENTEROLOGY | Facility: CLINIC | Age: 34
End: 2024-05-13
Payer: COMMERCIAL

## 2024-05-13 NOTE — TELEPHONE ENCOUNTER
----- Message from Dolores Greene sent at 5/13/2024  2:41 PM CDT -----  Regarding: EGD  Type:  Needs Medical Advice    Who Called: Pt    Would the patient rather a call back or a response via [facility]? Call back    Best Call Back Number: [phone]    Additional Information: Pt have upcoming EGD 5/15/2024 and would to cancel. Thank you

## 2024-05-13 NOTE — TELEPHONE ENCOUNTER
Call placed to Lisbeth Dimas in regards to message received. He stated he recently had knee surgery and is in more pain than he thought he would be so he would like to cancel his EGD at this time, and will call back at a later date to reschedule. No further issues noted.

## 2024-10-28 ENCOUNTER — TELEPHONE (OUTPATIENT)
Dept: GASTROENTEROLOGY | Facility: CLINIC | Age: 34
End: 2024-10-28
Payer: MEDICAID

## 2024-10-29 ENCOUNTER — TELEPHONE (OUTPATIENT)
Dept: ORTHOPEDICS | Facility: CLINIC | Age: 34
End: 2024-10-29
Payer: MEDICAID

## 2024-11-22 ENCOUNTER — OFFICE VISIT (OUTPATIENT)
Dept: GASTROENTEROLOGY | Facility: CLINIC | Age: 34
End: 2024-11-22
Payer: MEDICAID

## 2024-11-22 VITALS
BODY MASS INDEX: 28.77 KG/M2 | WEIGHT: 189.81 LBS | DIASTOLIC BLOOD PRESSURE: 87 MMHG | SYSTOLIC BLOOD PRESSURE: 130 MMHG | HEIGHT: 68 IN | HEART RATE: 103 BPM

## 2024-11-22 DIAGNOSIS — K59.04 CHRONIC IDIOPATHIC CONSTIPATION: Primary | ICD-10-CM

## 2024-11-22 DIAGNOSIS — R14.0 BLOATING: ICD-10-CM

## 2024-11-22 PROCEDURE — 99999 PR PBB SHADOW E&M-EST. PATIENT-LVL III: CPT | Mod: PBBFAC,,,

## 2024-11-22 PROCEDURE — 99213 OFFICE O/P EST LOW 20 MIN: CPT | Mod: PBBFAC

## 2024-11-22 NOTE — PATIENT INSTRUCTIONS
Try taking Miralax every other day.   Maintain high fiber diet and adequate hydration  Submit stool sample to test for H.pylori.

## 2024-11-22 NOTE — PROGRESS NOTES
"    [facility] Follow-UP Note    Reason for Follow-Up:  The primary encounter diagnosis was Chronic idiopathic constipation. A diagnosis of Bloating was also pertinent to this visit.    PCP:   Lily, Primary Doctor       HPI:  This is a 34 y.o. male last seen in GI clinic on 3/26/2024 for intermittent, periumbilical abdominal pain that lasts about 15 seconds, associated with constipation. Regular BM every 4 days. Pt was advised to maintain high fiber diet and adequate hydration, as well as continuing with Miralax daily.     Interval History:  Today, pt presents for constipation, left sided abdominal pain, and bloating x 1 year since being hospitalized for osteomyelitis of the knee, requiring 2 months of antibiotics. Pt states the only medication he currently takes is Methadone. He reports having regular formed BM for 2-3 days after taking Miralax for about 3-4 days in a row, then he will not have BM until he takes Miralax again for several days in a row. States if he takes Miralax daily, he will have diarrhea. Reports intermittent left sided pain that is relieved with BM. He has been taking Complete [employer] probiotics with about 50% improvement. Pt feels that his gut microbiome is all messed up from the antibiotics. He endorses bloating. Denies excess belching, gas and change in appetite. Pt denies N/V/D, dysphagia, reflux, bloody stools, rectal bleeding, melena, or unintentional weight loss. Denies known FHx of GI malignancies.     Of note, pt was previously seen by GI and scheduled for scopes, but lost insurance during that time and had to cancel.     Objective Findings:    Vital Signs:  /87   Pulse 103   Ht 5' 8" (1.727 m)   Wt 86.1 kg (189 lb 13.1 oz)   BMI 28.86 kg/m²   Body mass index is 28.86 kg/m².    Physical Exam:  General Appearance: Well appearing in no acute distress  Abdomen: Soft, non tender, non distended in all four quadrants. No hepatosplenomegaly, ascites, or " mass      Assessment:  1. Chronic idiopathic constipation    2. Bloating      This is a 34 y.o M with constipation, left sided abdominal pain, and bloating x 1 year since being hospitalized for osteomyelitis of the knee, requiring 2 months of antibiotics. He has been taking Complete Gut Health probiotics with about 50% improvement. Of note, pt was previously seen by GI and scheduled for scopes, but lost insurance during that time and had to cancel.     - Ordered H.pylori stool test  - Recommended he try taking miralax every other day  - Discussed high fiber diet and/or taking fiber supplement daily  - Continue probiotics   - May consider scopes    Order summary:  Orders Placed This Encounter    H. pylori antigen, stool     Thank you so much for allowing me to participate in the care of Clinton N Kern Sarah Abukhader, PA-C [facility]

## 2025-02-27 ENCOUNTER — OFFICE VISIT (OUTPATIENT)
Dept: FAMILY MEDICINE | Facility: CLINIC | Age: 35
End: 2025-02-27
Payer: MEDICAID

## 2025-02-27 ENCOUNTER — TELEPHONE (OUTPATIENT)
Dept: GASTROENTEROLOGY | Facility: CLINIC | Age: 35
End: 2025-02-27
Payer: MEDICAID

## 2025-02-27 DIAGNOSIS — R42 DIZZINESS: Primary | ICD-10-CM

## 2025-02-27 RX ORDER — MECLIZINE HCL 12.5 MG 12.5 MG/1
12.5 TABLET ORAL 3 TIMES DAILY PRN
Qty: 25 TABLET | Refills: 0 | Status: SHIPPED | OUTPATIENT
Start: 2025-02-27

## 2025-02-27 NOTE — TELEPHONE ENCOUNTER
MA returned call/spoke with patient in regards to a sooner appointment. Let patient know that at this time Miss Vila has no sooner appointment. Patient verbalized understanding.  Patient has been added to the wait list as well.

## 2025-02-27 NOTE — TELEPHONE ENCOUNTER
----- Message from Belem sent at 2/27/2025  2:00 PM CST -----  Regarding: self  Patient is requesting a sooner appointment.  Patient declined first available appointment listed as well as another facility and provider .  Patient will not accept being placed on the waitlist and is requesting a message be sent to doctor.Name of Caller: selfWhen is the first available appointment? Pt is scheduled in MaySymptoms: gut problemsWould the patient rather a call back or a response via [facility]?Best Call Back Number: [phone] Additional Information:  pt prefers any sooner evening appts

## 2025-02-28 NOTE — PROGRESS NOTES
The patient location is: [place]  The chief complaint leading to consultation is: No chief complaint on file.    Visit type: audiovisual    Each patient to whom he or she provides medical services by telemedicine is:  (1) informed of the relationship between the physician and patient and the respective role of any other health care provider with respect to management of the patient; and (2) notified that he or she may decline to receive medical services by telemedicine and may withdraw from such care at any time.      Subjective:       Patient ID: Sergio Cochran is a 34 y.o. male.    Chief Complaint: No chief complaint on file.      HPI  35 yo male presents for dizziness. Started about last week. Has previous episodes. States he has ringing in his ears as well. Feel most of his symptoms started after he has osteo treated w/ abx. Feels he has a gut issue since then. Does take methadone daily.    Review of Systems   Constitutional: Negative.    HENT: Negative.     Respiratory: Negative.     Cardiovascular: Negative.    Gastrointestinal: Negative.    Endocrine: Negative.    Genitourinary: Negative.    Musculoskeletal: Negative.    Neurological: Negative.    Psychiatric/Behavioral: Negative.            Past Medical History:   Diagnosis Date    Depression 09/22/2014    Liver disease     Hx of hep C - treated     No past surgical history on file.  Family History   Problem Relation Name Age of Onset    Colon polyps Mother Kaity     Cancer Mother Kaity     Alcohol abuse Mother Kaity     Colon cancer Maternal Uncle  60    Esophageal cancer Neg Hx      Stomach cancer Neg Hx       Social History     Socioeconomic History    Marital status: Single   Tobacco Use    Smoking status: Former     Current packs/day: 1.00     Types: Cigarettes    Smokeless tobacco: Never    Tobacco comments:     Stopped vaping 3 weeks ago   Substance and Sexual Activity    Alcohol use: Not Currently     Comment: has not had alcohol in 1 year    Drug use: No      Types: Cocaine     Comment: heroin, cocaine; Stooped a 1 1/2 ago.    Sexual activity: Yes     Partners: Female     Social Drivers of Health     Financial Resource Strain: Medium Risk (2/26/2025)    Overall Financial Resource Strain (CARDIA)     Difficulty of Paying Living Expenses: Somewhat hard   Food Insecurity: No Food Insecurity (2/26/2025)    Hunger Vital Sign     Worried About Running Out of Food in the Last Year: Never true     Ran Out of Food in the Last Year: Never true   Transportation Needs: No Transportation Needs (2/26/2025)    PRAPARE - Transportation     Lack of Transportation (Medical): No     Lack of Transportation (Non-Medical): No   Physical Activity: Inactive (2/26/2025)    Exercise Vital Sign     Days of Exercise per Week: 0 days     Minutes of Exercise per Session: 0 min   Stress: Stress Concern Present (2/26/2025)    [employer] - Occupational Stress Questionnaire     Feeling of Stress : To some extent   Housing Stability: Low Risk  (2/26/2025)    Housing Stability Vital Sign     Unable to Pay for Housing in the Last Year: No     Number of Times Moved in the Last Year: 0     Homeless in the Last Year: No     Current Medications[1]   Objective:      There were no vitals filed for this visit.    Physical Exam  Constitutional:       General: He is not in acute distress.     Appearance: He is not diaphoretic.   HENT:      Head: Normocephalic and atraumatic.   Eyes:      Conjunctiva/sclera: Conjunctivae normal.   Pulmonary:      Effort: Pulmonary effort is normal.   Musculoskeletal:         General: Normal range of motion.      Cervical back: Neck supple.   Skin:     Findings: No rash.   Neurological:      Mental Status: He is alert and oriented to person, place, and time.   Psychiatric:         Behavior: Behavior normal.         Thought Content: Thought content normal.         Judgment: Judgment normal.            Assessment:       1. Dizziness        Plan:        Dizziness  -     meclizine (ANTIVERT) 12.5 mg tablet; Take 1 tablet (12.5 mg total) by mouth 3 (three) times daily as needed.  Dispense: 25 tablet; Refill: 0    Advised pt to establish care w/ a PCP. Has a f/u w/ GI in a few months. Keep f/u. Will try antivert for dizziness. Has experienced tinnitus as well. Pt would benefit from an in person visit.  Future Appointments   Date Time Provider Department Center   5/15/2025  2:20 PM Cadence Vasquez PA-C [facility]                   Patient note was created using "[employer]  Any errors in syntax or even information may not have been identified and edited on initial review prior to signing this note.         [1]   Current Outpatient Medications:     meclizine (ANTIVERT) 12.5 mg tablet, Take 1 tablet (12.5 mg total) by mouth 3 (three) times daily as needed., Disp: 25 tablet, Rfl: 0    methadone HCl (METHADONE ORAL), Take by mouth., Disp: , Rfl:     UNABLE TO FIND, OTC Probiotics/Probiotics, Disp: , Rfl:

## 2025-04-24 ENCOUNTER — OFFICE VISIT (OUTPATIENT)
Dept: FAMILY MEDICINE | Facility: CLINIC | Age: 35
End: 2025-04-24
Payer: MEDICAID

## 2025-04-24 DIAGNOSIS — B18.2 CHRONIC HEPATITIS C WITHOUT HEPATIC COMA: Primary | ICD-10-CM

## 2025-04-24 PROCEDURE — 98006 SYNCH AUDIO-VIDEO EST MOD 30: CPT | Mod: 95,,, | Performed by: FAMILY MEDICINE

## 2025-04-24 PROCEDURE — 1159F MED LIST DOCD IN RCRD: CPT | Mod: CPTII,95,, | Performed by: FAMILY MEDICINE

## 2025-04-24 NOTE — PROGRESS NOTES
The patient location is: [place]  The chief complaint leading to consultation is: Referral (Hepatology due to Hx Cirrhosis)    Total time: 30 minutes  Visit type: audiovisual    Each patient to whom he or she provides medical services by telemedicine is:  (1) informed of the relationship between the physician and patient and the respective role of any other health care provider with respect to management of the patient; and (2) notified that he or she may decline to receive medical services by telemedicine and may withdraw from such care at any time.      Subjective:       Patient ID: Sergio Cochran is a 34 y.o. male.    Chief Complaint: Referral (Hepatology due to Hx Cirrhosis)      HPI  34-year-old male presents for referral.  States he needs referral to hepatology.  Patient does not exactly remember why but states he was previously diagnosed with cirrhosis.  Last CT on 08/2023 shows a normal liver.  When asked if patient has history of hepatitis-C he remembered he was treated for hep C.  Stated that is the reason why he needed a follow-up with hepatology.  Did see GI very recently but was for constipation.    Review of Systems   Constitutional: Negative.    HENT: Negative.     Respiratory: Negative.     Cardiovascular: Negative.    Gastrointestinal: Negative.    Endocrine: Negative.    Genitourinary: Negative.    Musculoskeletal: Negative.    Neurological: Negative.    Psychiatric/Behavioral: Negative.            Past Medical History:   Diagnosis Date    Depression 09/22/2014    Liver disease     Hx of hep C - treated     History reviewed. No pertinent surgical history.  Family History   Problem Relation Name Age of Onset    Colon polyps Mother Kaity     Cancer Mother Kaity     Alcohol abuse Mother Kaity     Colon cancer Maternal Uncle  60    Esophageal cancer Neg Hx      Stomach cancer Neg Hx       Social History     Socioeconomic History    Marital status: Single   Tobacco Use    Smoking status: Former     Current  packs/day: 1.00     Types: Cigarettes    Smokeless tobacco: Never    Tobacco comments:     Stopped vaping 3 weeks ago   Substance and Sexual Activity    Alcohol use: Not Currently     Comment: has not had alcohol in 1 year    Drug use: No     Types: Cocaine     Comment: heroin, cocaine; Stooped a 1 1/2 ago.    Sexual activity: Yes     Partners: Female     Social Drivers of Health     Financial Resource Strain: Medium Risk (2/26/2025)    Overall Financial Resource Strain (CARDIA)     Difficulty of Paying Living Expenses: Somewhat hard   Food Insecurity: No Food Insecurity (2/26/2025)    Hunger Vital Sign     Worried About Running Out of Food in the Last Year: Never true     Ran Out of Food in the Last Year: Never true   Transportation Needs: No Transportation Needs (2/26/2025)    PRAPARE - Transportation     Lack of Transportation (Medical): No     Lack of Transportation (Non-Medical): No   Physical Activity: Inactive (2/26/2025)    Exercise Vital Sign     Days of Exercise per Week: 0 days     Minutes of Exercise per Session: 0 min   Stress: Stress Concern Present (2/26/2025)    [employer] [place] [employer] - Occupational Stress Questionnaire     Feeling of Stress : To some extent   Housing Stability: Low Risk  (2/26/2025)    Housing Stability Vital Sign     Unable to Pay for Housing in the Last Year: No     Number of Times Moved in the Last Year: 0     Homeless in the Last Year: No     Current Medications[1]   Objective:      There were no vitals filed for this visit.    Physical Exam  Constitutional:       General: He is not in acute distress.     Appearance: He is not diaphoretic.   HENT:      Head: Normocephalic and atraumatic.   Eyes:      Conjunctiva/sclera: Conjunctivae normal.   Pulmonary:      Effort: Pulmonary effort is normal.   Musculoskeletal:      Cervical back: Neck supple.   Skin:     Findings: No rash.   Neurological:      Mental Status: He is alert and oriented to person, place, and  time.   Psychiatric:         Behavior: Behavior normal.         Thought Content: Thought content normal.         Judgment: Judgment normal.            Assessment:       1. Chronic hepatitis C without hepatic coma        Plan:       Chronic hepatitis C without hepatic coma  -     Hepatitis C RNA, Quantitative, PCR; Future; Expected date: 04/24/2025    Place order for hep C quant.  Patient stated he was treated for hepatitis-C infection  Future Appointments   Date Time Provider Department Center   5/15/2025  2:30 PM Cadence Vasquez PA-C [facility]                   Patient note was created using [employer].  Any errors in syntax or even information may not have been identified and edited on initial review prior to signing this note.       [1]   Current Outpatient Medications:     meclizine (ANTIVERT) 12.5 mg tablet, Take 1 tablet (12.5 mg total) by mouth 3 (three) times daily as needed., Disp: 25 tablet, Rfl: 0    methadone HCl (METHADONE ORAL), Take by mouth., Disp: , Rfl:     UNABLE TO FIND, OTC Probiotics/Probiotics, Disp: , Rfl:

## 2025-04-28 ENCOUNTER — LAB VISIT (OUTPATIENT)
Dept: LAB | Facility: HOSPITAL | Age: 35
End: 2025-04-28
Attending: FAMILY MEDICINE
Payer: MEDICAID

## 2025-04-28 DIAGNOSIS — B18.2 CHRONIC HEPATITIS C WITHOUT HEPATIC COMA: ICD-10-CM

## 2025-04-28 PROCEDURE — 87522 HEPATITIS C REVRS TRNSCRPJ: CPT

## 2025-04-28 PROCEDURE — 36415 COLL VENOUS BLD VENIPUNCTURE: CPT

## 2025-04-29 ENCOUNTER — RESULTS FOLLOW-UP (OUTPATIENT)
Dept: FAMILY MEDICINE | Facility: CLINIC | Age: 35
End: 2025-04-29

## 2025-04-29 LAB — HCV RNA SERPL NAA+PROBE-LOG IU: NOT DETECTED {LOG_IU}/ML
